# Patient Record
Sex: FEMALE | Race: WHITE | HISPANIC OR LATINO | URBAN - METROPOLITAN AREA
[De-identification: names, ages, dates, MRNs, and addresses within clinical notes are randomized per-mention and may not be internally consistent; named-entity substitution may affect disease eponyms.]

---

## 2022-10-27 ENCOUNTER — EMERGENCY (EMERGENCY)
Facility: HOSPITAL | Age: 27
LOS: 1 days | Discharge: HOME | End: 2022-10-27
Attending: STUDENT IN AN ORGANIZED HEALTH CARE EDUCATION/TRAINING PROGRAM
Payer: SELF-PAY

## 2022-10-27 VITALS
WEIGHT: 147.71 LBS | TEMPERATURE: 97 F | SYSTOLIC BLOOD PRESSURE: 122 MMHG | DIASTOLIC BLOOD PRESSURE: 87 MMHG | RESPIRATION RATE: 15 BRPM | HEART RATE: 72 BPM | OXYGEN SATURATION: 100 % | HEIGHT: 62.99 IN

## 2022-10-27 LAB — HCG SERPL-ACNC: HIGH MIU/ML

## 2022-10-27 PROCEDURE — 99284 EMERGENCY DEPT VISIT MOD MDM: CPT

## 2022-10-27 PROCEDURE — 84702 CHORIONIC GONADOTROPIN TEST: CPT

## 2022-10-27 PROCEDURE — 99284 EMERGENCY DEPT VISIT MOD MDM: CPT | Mod: 25

## 2022-10-27 PROCEDURE — 96374 THER/PROPH/DIAG INJ IV PUSH: CPT

## 2022-10-27 PROCEDURE — 36415 COLL VENOUS BLD VENIPUNCTURE: CPT

## 2022-10-27 RX ORDER — METOCLOPRAMIDE HCL 10 MG
10 TABLET ORAL ONCE
Refills: 0 | Status: COMPLETED | OUTPATIENT
Start: 2022-10-27 | End: 2022-10-27

## 2022-10-27 RX ORDER — ACETAMINOPHEN 500 MG
650 TABLET ORAL ONCE
Refills: 0 | Status: COMPLETED | OUTPATIENT
Start: 2022-10-27 | End: 2022-10-27

## 2022-10-27 RX ADMIN — Medication 650 MILLIGRAM(S): at 15:47

## 2022-10-27 RX ADMIN — Medication 10 MILLIGRAM(S): at 15:47

## 2022-10-27 NOTE — ED PROVIDER NOTE - ATTENDING CONTRIBUTION TO CARE
26 yo  female EGA~5 weeks presents to ED c/o right sided occipital headache since this morning. A/w tingling in arms but no longer. Is visiting from [facility] and had positive home pregnancy test. Returning home in about 1.5 weeks and plans to establish care there. Denies abd pain, dysuria, vaginal bleeding. Has h/o of headache in past similar to this  AP - nonfocal neuro exam. likely benign headache. will treat supportively. no obgyn complaints, patient comfortable with outpt f/u upon return home

## 2022-10-27 NOTE — ED PROVIDER NOTE - NS ED ATTENDING STATEMENT MOD
I have seen and examined this patient and fully participated in the care of this patient as the teaching attending.  The service was shared with the TATA.  I reviewed and verified the documentation and independently performed the documented:

## 2022-10-27 NOTE — ED ADULT TRIAGE NOTE - SOURCE OF INFORMATION
From: Doreen Field  To: E-Visit Providers  Sent: 10/19/2020 10:05 AM CDT  Subject: Sinus pain    Sinus pain  --------------------------------    Question: To help us route your E-Visit to the right provider, where is your current residence?  Answer:   [place]    Question: Have you seen a health care provider for this complaint within the last 7 days?  Answer:   No    Question: How long have you been having these symptoms?  Answer:   For a few days    Question: Did your symptoms start with high fever?  Answer:   No    Question: Have you had any of the following exposures in the past 14 days?  Answer:   None    Question: Have you traveled in the past 14 days?  Answer:   No    Question: Do you have a fever?  Answer:   No, I do not have a fever    Question: Do you have any of the following symptoms: double vision, noticeable swelling around one or both eyes, severe headaches?   Answer:   Not applicable    Question: Do you have a history of allergies?  Answer:   No    Question: What color is your nasal drainage: clear, white, yellow, green, bloody?  Answer:   White             Yellow    Question: Do your symptoms interfere with any of these activities: sleep, work, or daily activities, leisure activities, sports or none?  Answer:   Work    Question: Have you been experiencing any of the following symptoms: congested nose, pain in the nose and face, headache, ear pain, neck pain, throat pain, cough, wheezing, or none of the above?  Answer:   Congested nose             Pain in the nose and face             Headache             Ear pain             Throat pain    Question: Do you smoke?  Answer:   No    Question: Have you ever smoked?  Answer:   I have never smoked    Question: Do you have any chronic illnesses such as diabetes, heart disease, lung disease, or any illness that would weaken your ability to fight infection?  Answer:   No    Question: Do you take any medications that can suppress the immune system  (chemotherapy, steroids, transplant antirejection medications)?    Answer:   No    Question: Have you experienced chronic sinus infections in the past?  Answer:   No    Question: Have you recently been hospitalized?  Answer:   No    Question: Have you taken antibiotics in the last 30 days?  Answer:   I have not been on any antibiotics    Question: What medications are you currently taking for your symptoms: decongestants, pain medicine, nose spray, antibiotics or nothing?  Answer:   Pain medicine    Question: Please enter the names of any medications you are taking, or any other treatments you are trying:  Answer:   Ibuprofen    Question: Are you pregnant?  Answer:   I am confident that I am not pregnant    Question: Do you need a work/school excuse letter?  Answer:   No    Question: Is there anything else you would like to add?   Answer:   Pretty sure I have a sinus infection, as my teeth hurt as well. I don't have chronic sinus infections, but I do get them occasionally.    Question: Do you have any questions?  Answer:       Question: Thank you for completing this questionnaire. One or more of your responses may indicate that a face-to-face evaluation of these symptoms with a physician is warranted. If that is necessary, what is the preferred telephone number that we can use to contact you?  Answer:   [phone]     Patient

## 2022-10-27 NOTE — ED PROVIDER NOTE - CLINICAL SUMMARY MEDICAL DECISION MAKING FREE TEXT BOX
This is a 26 yo  female EGA~5 weeks c/o right sided occipital headache x6 hrs with mild numbness/tinging down left arm that resolved while in ED. Similar episode last year, went to PCP dx with tension headache. Pt is scheduled to go back to [facility] , will obtain prenatal care there. Pt is well-appearing, vital signs are stable, PE WINL. Pt given Tylenol for headache and Reglan for nausea. Will monitor and re-assess.

## 2022-10-27 NOTE — ED ADULT TRIAGE NOTE - CHIEF COMPLAINT QUOTE
pt arrived to ER vis walk in triage. Pt states that she is 5 weeks pregnant. Pt developed a headache about 2 hours ago. Pt states that the headache is 9/10. Pt states that she developed left arm and mouth numbness that has since resolved. Pt has equal strength in her bilateral extremities and no facial droop. Pt denies visual disturbance and is noted to have steady gait.

## 2022-10-27 NOTE — ED PROVIDER NOTE - CHPI ED SYMPTOMS NEG
no blurred vision/no confusion/no loss of consciousness/no seizure/no syncope/no vomiting/no weakness/no change in level of consciousness

## 2022-10-27 NOTE — ED PROVIDER NOTE - PATIENT PORTAL LINK FT
You can access the FollowMyHealth Patient Portal offered by [facility] by registering at the following website: [email or web address]. By joining [employer]’s FollowMyHealth portal, you will also be able to view your health information using other applications (apps) compatible with our system.

## 2022-10-27 NOTE — ED PROVIDER NOTE - OBJECTIVE STATEMENT
26 yo  female EGA~5 weeks presents to ED c/o right sided occipital headache x6 hrs that began upon waking up this morning. Shortly after that pt began experiencing numbness/tinging down left arm and around mouth and associated nausea with room spinning sensation.  Denies facial droop, weakness, visual changes, vomiting, head trauma or LOC. Denies head trauma. Pt reports hx of similar episode last year, went to PCP dx with tension headache.   Of note pt is from [facility], has been in [place] for 3 [place] visiting her sister with plan to return to [facility] next week. While here she took a pregnancy test which was positive, states she has not established prenatal care yet but plans to do so when she goes back to [facility]. Denies abdominal pain, fluid loss, vaginal bleeding, or any other GYN symptoms.

## 2022-10-27 NOTE — ED ADULT NURSE NOTE - OBJECTIVE STATEMENT
Pt comes in complaining of headaches for a couple days. Pt states nothing makes it better or worse. Pt states she possibly is pregnant and is requesting an HCG exam.